# Patient Record
Sex: FEMALE | Race: WHITE | NOT HISPANIC OR LATINO | URBAN - METROPOLITAN AREA
[De-identification: names, ages, dates, MRNs, and addresses within clinical notes are randomized per-mention and may not be internally consistent; named-entity substitution may affect disease eponyms.]

---

## 2017-11-17 ENCOUNTER — IMPORTED ENCOUNTER (OUTPATIENT)
Dept: URBAN - METROPOLITAN AREA CLINIC 50 | Facility: CLINIC | Age: 72
End: 2017-11-17

## 2017-11-28 ENCOUNTER — IMPORTED ENCOUNTER (OUTPATIENT)
Dept: URBAN - METROPOLITAN AREA CLINIC 50 | Facility: CLINIC | Age: 72
End: 2017-11-28

## 2017-12-11 ENCOUNTER — IMPORTED ENCOUNTER (OUTPATIENT)
Dept: URBAN - METROPOLITAN AREA CLINIC 50 | Facility: CLINIC | Age: 72
End: 2017-12-11

## 2018-05-31 ENCOUNTER — IMPORTED ENCOUNTER (OUTPATIENT)
Dept: URBAN - METROPOLITAN AREA CLINIC 50 | Facility: CLINIC | Age: 73
End: 2018-05-31

## 2018-05-31 NOTE — PATIENT DISCUSSION
"""Reassured patient that intraocular pressures (IOPs) are at target levels and other ocular findings are stable. Continue present management and/or medication(s).  Follow up as directed. "" ""Continue Combigan right eye twice a day. "" ""Continue Travatan Z both eyes at bedtime. """

## 2018-12-06 ENCOUNTER — IMPORTED ENCOUNTER (OUTPATIENT)
Dept: URBAN - METROPOLITAN AREA CLINIC 50 | Facility: CLINIC | Age: 73
End: 2018-12-06

## 2019-05-07 ENCOUNTER — IMPORTED ENCOUNTER (OUTPATIENT)
Dept: URBAN - METROPOLITAN AREA CLINIC 50 | Facility: CLINIC | Age: 74
End: 2019-05-07

## 2019-06-11 ENCOUNTER — IMPORTED ENCOUNTER (OUTPATIENT)
Dept: URBAN - METROPOLITAN AREA CLINIC 50 | Facility: CLINIC | Age: 74
End: 2019-06-11

## 2019-07-22 ENCOUNTER — IMPORTED ENCOUNTER (OUTPATIENT)
Dept: URBAN - METROPOLITAN AREA CLINIC 50 | Facility: CLINIC | Age: 74
End: 2019-07-22

## 2020-05-14 ENCOUNTER — IMPORTED ENCOUNTER (OUTPATIENT)
Dept: URBAN - METROPOLITAN AREA CLINIC 50 | Facility: CLINIC | Age: 75
End: 2020-05-14

## 2020-10-14 ENCOUNTER — IMPORTED ENCOUNTER (OUTPATIENT)
Dept: URBAN - METROPOLITAN AREA CLINIC 50 | Facility: CLINIC | Age: 75
End: 2020-10-14

## 2020-12-08 ENCOUNTER — IMPORTED ENCOUNTER (OUTPATIENT)
Dept: URBAN - METROPOLITAN AREA CLINIC 50 | Facility: CLINIC | Age: 75
End: 2020-12-08

## 2021-04-18 ASSESSMENT — TONOMETRY
OS_IOP_MMHG: 18
OD_IOP_MMHG: 20
OD_IOP_MMHG: 17
OS_IOP_MMHG: 18
OD_IOP_MMHG: 18
OD_IOP_MMHG: 17
OD_IOP_MMHG: 17
OS_IOP_MMHG: 15
OD_IOP_MMHG: 18
OS_IOP_MMHG: 19
OD_IOP_MMHG: 16
OD_IOP_MMHG: 17
OS_IOP_MMHG: 19
OS_IOP_MMHG: 19
OD_IOP_MMHG: 19
OD_IOP_MMHG: 17

## 2021-04-18 ASSESSMENT — PACHYMETRY
OD_CT_UM: 534
OS_CT_UM: 536
OD_CT_UM: 534
OS_CT_UM: 536
OS_CT_UM: 536
OD_CT_UM: 534
OS_CT_UM: 536
OD_CT_UM: 534
OS_CT_UM: 536
OD_CT_UM: 534
OS_CT_UM: 536
OD_CT_UM: 534
OS_CT_UM: 536

## 2021-04-18 ASSESSMENT — VISUAL ACUITY
OD_SC: 20/30-
OS_SC: 20/30-1
OD_SC: 20/30-1
OS_PH: 20/30
OS_SC: 20/30-
OD_CC: J2
OS_CC: J1
OD_CC: J1 -2
OD_PH: 20/30
OS_PH: 20/25
OD_SC: 20/30
OD_SC: 20/30-
OS_SC: 20/30
OS_SC: 20/60
OD_SC: 20/30-1
OS_SC: 20/30-
OD_CC: J1
OS_PH: 20/40-2
OS_CC: J1 -2
OS_SC: 20/30-1
OS_CC: J2
OD_SC: 20/40-2
OD_PH: 20/25

## 2021-08-16 ENCOUNTER — PREPPED CHART (OUTPATIENT)
Dept: URBAN - METROPOLITAN AREA CLINIC 49 | Facility: CLINIC | Age: 76
End: 2021-08-16

## 2021-08-19 ENCOUNTER — COMPREHENSIVE EXAM (OUTPATIENT)
Dept: URBAN - METROPOLITAN AREA CLINIC 49 | Facility: CLINIC | Age: 76
End: 2021-08-19

## 2021-08-19 DIAGNOSIS — H40.1132: ICD-10-CM

## 2021-08-19 DIAGNOSIS — H47.233: ICD-10-CM

## 2021-08-19 DIAGNOSIS — H35.371: ICD-10-CM

## 2021-08-19 DIAGNOSIS — H43.813: ICD-10-CM

## 2021-08-19 PROCEDURE — 92134 CPTRZ OPH DX IMG PST SGM RTA: CPT

## 2021-08-19 PROCEDURE — 92014 COMPRE OPH EXAM EST PT 1/>: CPT

## 2021-08-19 RX ORDER — TIMOLOL MALEATE 6.8 MG/ML
1 SOLUTION OPHTHALMIC TWICE A DAY
Start: 2021-08-19

## 2021-08-19 ASSESSMENT — VISUAL ACUITY
OU_CC: J1+
OD_CC: 20/40+2
OS_CC: 20/40
OU_SC: J1

## 2021-08-19 ASSESSMENT — TONOMETRY
OS_IOP_MMHG: 16
OS_IOP_MMHG: 16
OD_IOP_MMHG: 16
OD_IOP_MMHG: 17

## 2021-08-19 NOTE — PATIENT DISCUSSION
iop is in range despite patient stopping her medications about 4 months ago due to her  being sick. Recommend patient start timolol 2 times a day both eyes.   recommend hvf/oct rnfl at patient convienence and then may start  addtional medication after.  will recheck iop in 1 month.

## 2021-10-21 ENCOUNTER — DIAGNOSTICS - HVF/OCT (OUTPATIENT)
Dept: URBAN - METROPOLITAN AREA CLINIC 48 | Facility: CLINIC | Age: 76
End: 2021-10-21

## 2021-10-21 DIAGNOSIS — H40.1132: ICD-10-CM

## 2021-10-21 PROCEDURE — 92083 EXTENDED VISUAL FIELD XM: CPT

## 2021-10-21 PROCEDURE — 92133 CPTRZD OPH DX IMG PST SGM ON: CPT

## 2021-10-28 ENCOUNTER — IOP CHECK (OUTPATIENT)
Dept: URBAN - METROPOLITAN AREA CLINIC 49 | Facility: CLINIC | Age: 76
End: 2021-10-28

## 2021-10-28 DIAGNOSIS — H40.1132: ICD-10-CM

## 2021-10-28 PROCEDURE — 92012 INTRM OPH EXAM EST PATIENT: CPT

## 2021-10-28 ASSESSMENT — TONOMETRY
OS_IOP_MMHG: 14
OD_IOP_MMHG: 14
OS_IOP_MMHG: 14
OD_IOP_MMHG: 15

## 2021-10-28 ASSESSMENT — VISUAL ACUITY
OD_PH: 20/40
OS_SC: 20/40-2
OD_SC: 20/50-2

## 2021-10-28 NOTE — PATIENT DISCUSSION
iop in range today,  [facility] from 10/21/2021 reviewed with patient.  recommend patient continue timolol twice a day in both eyes.

## 2022-04-28 ENCOUNTER — FOLLOW UP (OUTPATIENT)
Dept: URBAN - METROPOLITAN AREA CLINIC 49 | Facility: CLINIC | Age: 77
End: 2022-04-28

## 2022-04-28 DIAGNOSIS — H40.1132: ICD-10-CM

## 2022-04-28 PROCEDURE — 92012 INTRM OPH EXAM EST PATIENT: CPT

## 2022-04-28 ASSESSMENT — TONOMETRY
OS_IOP_MMHG: 14
OD_IOP_MMHG: 12
OS_IOP_MMHG: 14
OD_IOP_MMHG: 13

## 2022-04-28 ASSESSMENT — VISUAL ACUITY
OD_SC: 20/40
OS_SC: 20/40+2

## 2022-04-28 NOTE — PATIENT DISCUSSION
The IOP is in the target range. Continue current Timolol drop therapy. Schedule HVF/RNFL OCT prior to next visit.

## 2022-10-13 ENCOUNTER — DIAGNOSTICS ONLY (OUTPATIENT)
Dept: URBAN - METROPOLITAN AREA CLINIC 49 | Facility: CLINIC | Age: 77
End: 2022-10-13

## 2022-10-13 DIAGNOSIS — H40.1132: ICD-10-CM

## 2022-10-13 PROCEDURE — 92133 CPTRZD OPH DX IMG PST SGM ON: CPT

## 2022-10-13 PROCEDURE — 92083 EXTENDED VISUAL FIELD XM: CPT

## 2022-11-03 ENCOUNTER — COMPREHENSIVE EXAM (OUTPATIENT)
Dept: URBAN - METROPOLITAN AREA CLINIC 49 | Facility: CLINIC | Age: 77
End: 2022-11-03

## 2022-11-03 DIAGNOSIS — H40.1132: ICD-10-CM

## 2022-11-03 DIAGNOSIS — H35.371: ICD-10-CM

## 2022-11-03 DIAGNOSIS — H04.123: ICD-10-CM

## 2022-11-03 PROCEDURE — 92134 CPTRZ OPH DX IMG PST SGM RTA: CPT

## 2022-11-03 PROCEDURE — 92014 COMPRE OPH EXAM EST PT 1/>: CPT

## 2022-11-03 ASSESSMENT — VISUAL ACUITY
OU_SC: J2 @ 15 IN
OD_SC: 20/40-1
OS_SC: 20/40+2

## 2022-11-03 ASSESSMENT — TONOMETRY
OS_IOP_MMHG: 13
OD_IOP_MMHG: 14
OD_IOP_MMHG: 15
OS_IOP_MMHG: 13

## 2023-04-27 ENCOUNTER — FOLLOW UP (OUTPATIENT)
Dept: URBAN - METROPOLITAN AREA CLINIC 49 | Facility: CLINIC | Age: 78
End: 2023-04-27

## 2023-04-27 DIAGNOSIS — H40.1132: ICD-10-CM

## 2023-04-27 DIAGNOSIS — H04.123: ICD-10-CM

## 2023-04-27 PROCEDURE — 92012 INTRM OPH EXAM EST PATIENT: CPT

## 2023-04-27 ASSESSMENT — TONOMETRY
OS_IOP_MMHG: 15
OD_IOP_MMHG: 15
OS_IOP_MMHG: 15
OD_IOP_MMHG: 14

## 2023-04-27 ASSESSMENT — VISUAL ACUITY
OD_SC: 20/40-2
OU_SC: 20/30
OS_SC: 20/25-2

## 2024-03-07 ENCOUNTER — DIAGNOSTICS ONLY (OUTPATIENT)
Dept: URBAN - METROPOLITAN AREA CLINIC 49 | Facility: LOCATION | Age: 79
End: 2024-03-07

## 2024-03-07 DIAGNOSIS — H40.1132: ICD-10-CM

## 2024-03-07 PROCEDURE — 92083 EXTENDED VISUAL FIELD XM: CPT

## 2024-03-07 PROCEDURE — 92133 CPTRZD OPH DX IMG PST SGM ON: CPT

## 2024-03-20 ENCOUNTER — COMPREHENSIVE EXAM (OUTPATIENT)
Dept: URBAN - METROPOLITAN AREA CLINIC 49 | Facility: LOCATION | Age: 79
End: 2024-03-20

## 2024-03-20 DIAGNOSIS — H35.371: ICD-10-CM

## 2024-03-20 DIAGNOSIS — H52.4: ICD-10-CM

## 2024-03-20 DIAGNOSIS — H40.1132: ICD-10-CM

## 2024-03-20 DIAGNOSIS — H43.813: ICD-10-CM

## 2024-03-20 DIAGNOSIS — H04.123: ICD-10-CM

## 2024-03-20 PROCEDURE — 92134 CPTRZ OPH DX IMG PST SGM RTA: CPT

## 2024-03-20 PROCEDURE — 92015 DETERMINE REFRACTIVE STATE: CPT

## 2024-03-20 PROCEDURE — 99214 OFFICE O/P EST MOD 30 MIN: CPT

## 2024-03-20 ASSESSMENT — TONOMETRY
OS_IOP_MMHG: 16
OD_IOP_MMHG: 16
OS_IOP_MMHG: 16
OD_IOP_MMHG: 15

## 2024-03-20 ASSESSMENT — VISUAL ACUITY
OS_SC: 20/40-1
OU_SC: J4 @ 16"
OD_PH: 20/20
OD_SC: 20/50+2

## 2024-12-12 ENCOUNTER — FOLLOW UP (OUTPATIENT)
Age: 79
End: 2024-12-12

## 2024-12-12 DIAGNOSIS — H04.123: ICD-10-CM

## 2024-12-12 DIAGNOSIS — H40.1132: ICD-10-CM

## 2024-12-12 DIAGNOSIS — H02.412: ICD-10-CM

## 2024-12-12 DIAGNOSIS — G70.00: ICD-10-CM

## 2024-12-12 PROCEDURE — 99213 OFFICE O/P EST LOW 20 MIN: CPT
